# Patient Record
Sex: FEMALE | Race: WHITE | NOT HISPANIC OR LATINO | Employment: FULL TIME | URBAN - METROPOLITAN AREA
[De-identification: names, ages, dates, MRNs, and addresses within clinical notes are randomized per-mention and may not be internally consistent; named-entity substitution may affect disease eponyms.]

---

## 2022-01-06 ENCOUNTER — TELEMEDICINE (OUTPATIENT)
Dept: INTERNAL MEDICINE | Facility: CLINIC | Age: 22
End: 2022-01-06

## 2022-01-06 DIAGNOSIS — F32.A ANXIETY AND DEPRESSION: Primary | ICD-10-CM

## 2022-01-06 DIAGNOSIS — F41.9 ANXIETY AND DEPRESSION: Primary | ICD-10-CM

## 2022-01-06 PROCEDURE — 99204 OFFICE O/P NEW MOD 45 MIN: CPT | Performed by: FAMILY MEDICINE

## 2022-01-06 RX ORDER — ESCITALOPRAM OXALATE 10 MG/1
10 TABLET ORAL DAILY
Qty: 60 TABLET | Refills: 1 | Status: SHIPPED | OUTPATIENT
Start: 2022-01-06 | End: 2022-03-17 | Stop reason: DRUGHIGH

## 2022-01-06 NOTE — PATIENT INSTRUCTIONS
"[email or web address]">   Supporting Someone With Anxiety  Anxiety is a mental health condition that causes nervousness or worry, which interferes with daily activities and relationships. Anxiety disorders include:  · Generalized anxiety disorder (GRANT).  · Social anxiety.  · Post-traumatic stress disorder (PTSD).  Anxiety affects the person who has it as well as friends and family members. Friends and family can help by offering support and understanding.  What do I need to know about this condition?  Anxiety is the experience of excessive nervousness or worry that feels out of control. Anxiety causes distress and prevents someone from living a normal life. Anxiety may:  · Last for long periods and be unpredictable.  · Cause restlessness, fatigue, extreme emotions, or irritability.  · Make it hard to fall asleep or focus your attention.  What do I need to know about the treatment options?  Anxiety disorders are treated by specialists with one of the following:  · Talk therapy or counseling. This includes cognitive behavioral therapy (CBT), exposure therapy, eye movement desensitization and reprocessing (EMDR), biofeedback, and acceptance and commitment therapy.  · Medicine to treat anxiety and to help control certain emotions and behaviors.  · Mind-body programs. These programs encourage the person with anxiety to be involved in his or her treatment and feel empowered. Mind-body programs may include mindfulness-based stress reduction training, yoga, or carmencita chi.  How to care for someone with anxiety    Talk about the condition  Good communication is the key to supporting your friend or family member. Keep these things in mind:  · Ask questions and listen to the person's answers. Validate his or her experience.  · Give the person space if she or he does not feel like talking.  · Do not minimize the person's feelings or suggest that he or she should just get over the feelings.  · Give the person time and space to " "become calm. Stay by his or her side.  · Never ignore what the person says about suicide.  ? Talking about suicide will not make your loved one want to commit suicide.  ? You or your loved one can reach out 24 hours a day to get free, private support (on the phone or a live online chat) from a suicide crisis helpline, such as the National Suicide Prevention Lifeline at 1-549.114.2613.  · If appropriate, go with the person to visits with a counselor or health care provider. If the person agrees, ask the health care provider for any advice he or she may have for you.  Create a safe environment  · For certain types of anxiety, such as PTSD, do these things:  ? Discuss alcohol and drugs. Come up with rules that you both agree to.  ? Discuss guns and other weapons. Agree on where they should be stored. Keep ammunition in a separate locked location.  · Make a written crisis plan. Include important phone numbers, such as the local crisis intervention team. Make sure that:  ? The person with anxiety knows about this plan and agrees with it.  ? Everyone who has regular contact with the person knows about the plan and knows what to do in an emergency.  ? The written plan is easily accessible and can be quickly put into action.  Support the person in other ways  · Make an effort to learn all you can about your loved one's form of anxiety.  · Include your loved one in activities. Invite him or her to go for walks and outings.  · Help your loved one follow his or her treatment plan as directed by health care providers. This could mean driving him or her to therapy sessions or suggesting ways to manage stress.  · Remember that your support really matters. Social support is a huge benefit for someone who is living with anxiety.  How to care for yourself  Supporting someone with anxiety can be demanding. Make sure to take care of yourself.  · Maintain your normal routine.  · Respect your own limits. Say \"no\" to requests that do not " work for you.  · Make time for activities that help you relax, such as spending time with friends.  · Practice deep breathing exercises to lower your stress. Attend some mind-body classes by yourself or with your loved one.  · Get plenty of sleep.  · Exercise several times a week.  · If you are struggling emotionally with guilt, fear, or anger, consider working with a therapist.  What are some signs that the person's condition is getting worse?  Signs that your loved one's condition may be getting worse include:  · Dramatic mood swings, such as irritability, tearfulness, or lacking any emotion.  · Avoiding activities that he or she used to enjoy, or isolating herself or himself.  · Drinking more alcohol than normal.  Where to find support  For both you and your loved one:  · Consider joining self-help and support groups. They can help you feel a sense of hope and connect you with local resources to help you learn more.  · Consider family therapy to help you stay connected.  Where to find more information  A health care provider may make recommendations. They include:  · Substance Abuse and Mental Health Services Administration (SAMHSA): www.samhsa.gov  · National [place] Mental Illness (YUMIKO): www.yumiko.org  Get help right away if:  · Your loved one's behavior becomes hard to predict (erratic).  · Your loved one shows behaviors such as seeing, feeling, tasting, or hearing things that are not real (hallucinations) or having flashbacks.  · Your loved one expresses thoughts about harming himself or herself or others.  If you ever feel like your loved one may hurt himself or herself or others, or shares thoughts about taking his or her own life, get help right away. You can go to your nearest emergency department or:  · Call your local emergency services (911 in the [place]  · Call a suicide crisis helpline, such as the National Suicide Prevention Lifeline at 1-230.597.4936. This is open 24 hours a day in the  [place]  · Text the Crisis Text Line at 600201 (in the [place]  Summary  · People with anxiety disorders experience overwhelming feelings of nervousness or worry. Friends and family can help by offering support and understanding.  · Anxiety disorders are treated by mental health specialists. Various forms of talk therapy such as CBT, EMDR, and exposure therapy combined with mind-body programs can be helpful.  · Be compassionate and listen to your loved one. Be available if your loved one wants to talk, but give your loved one space if he or she does not feel like talking.  · Find ways to care for your own body, mind, and well-being while supporting someone with anxiety. Try to maintain your normal routines and make time to do things that you enjoy.  This information is not intended to replace advice given to you by your health care provider. Make sure you discuss any questions you have with your health care provider.  Document Revised: 02/25/2021 Document Reviewed: 10/29/2020  Elsevier Patient Education © 2021 [employer].

## 2022-01-06 NOTE — PROGRESS NOTES
Mode of Visit: Video  Location of patient: home  You have chosen to receive care through a telehealth visit.  Does the patient consent to use a video/audio connection for your medical care today? Yes  The visit included audio and video interaction. No technical issues occurred during this visit.         Subjective   Ericka Begr is a 21 y.o. female.     History of Present Illness     Video Visit was done today because of COVID-19.  patient has consented to receive care via Video Visit   Patient location ;home    CC; To establish care, discuss the followings ;  Anxiety with depression ; has anxiety for > 2 months, sx gets worse lately , sx worse when she is around people and with COVID   Get nervious so easily , in bed all day  Feel sad all the time , her HR racing , HA , feels restless  +ve FHx for mental illness   Never dx with Anxiety or depression   She is not on any medication   Had 5 peoples passed in 2 weeks   Even before that , she is in lot of stress   Poor sleeping , sleeps on day time  She will start to work at [employer] night shift    No current outpatient medications on file prior to visit.     No current facility-administered medications on file prior to visit.       The following portions of the patient's history were reviewed and updated as appropriate: allergies, current medications, past family history, past medical history, past social history, past surgical history and problem list.    Review of Systems   Psychiatric/Behavioral: Positive for decreased concentration and depressed mood. Negative for agitation and behavioral problems. The patient is nervous/anxious.        Objective   There were no vitals taken for this visit.  Physical Exam  Constitutional:       General: She is not in acute distress.     Appearance: She is not ill-appearing, toxic-appearing or diaphoretic.   Neurological:      Mental Status: She is alert and oriented to person, place, and time.   Psychiatric:         Mood and  Affect: Mood normal.         Behavior: Behavior normal.         Thought Content: Thought content normal.         Judgment: Judgment normal.           Assessment/Plan      Diagnoses and all orders for this visit:    1. Anxiety and depression (Primary);  - New problem , will start on;  -     escitalopram (Lexapro) 10 MG tablet; Take 1 tablet by mouth Daily.  Dispense: 60 tablet; Refill: 1  Advised patient SSRI medication may take 4-6 weeks to notice an effect.  Discussed potential side effects including headache, dizziness, GI symptoms, sexual side effects, worsening mood or suicidal ideations.  Patient advised to call office for development of any side effects or questions. To ER for SI/HI.          I have reviewed the limitations of a telehealth visit (such as lack of a physical exam and unable to obtain vital signs) and advised the patient that they may need to follow up for an office visit should their symptoms or concerns persist, worsen, or change.  Patient was encouraged to keep me informed of any acute changes, lack of improvement, or any new concerning symptoms.   I discussed my findings,recommendations, and plan of care was with the patient. They verbalized understanding and agreement.

## 2022-02-14 ENCOUNTER — LAB (OUTPATIENT)
Dept: LAB | Facility: HOSPITAL | Age: 22
End: 2022-02-14

## 2022-02-14 ENCOUNTER — OFFICE VISIT (OUTPATIENT)
Dept: INTERNAL MEDICINE | Facility: CLINIC | Age: 22
End: 2022-02-14

## 2022-02-14 VITALS
SYSTOLIC BLOOD PRESSURE: 104 MMHG | WEIGHT: 247.2 LBS | BODY MASS INDEX: 51.89 KG/M2 | HEIGHT: 58 IN | TEMPERATURE: 96.6 F | OXYGEN SATURATION: 100 % | HEART RATE: 72 BPM | DIASTOLIC BLOOD PRESSURE: 68 MMHG

## 2022-02-14 DIAGNOSIS — F41.9 ANXIETY AND DEPRESSION: ICD-10-CM

## 2022-02-14 DIAGNOSIS — R68.89 COLD INTOLERANCE: ICD-10-CM

## 2022-02-14 DIAGNOSIS — F32.A ANXIETY AND DEPRESSION: ICD-10-CM

## 2022-02-14 DIAGNOSIS — F32.A ANXIETY AND DEPRESSION: Primary | ICD-10-CM

## 2022-02-14 DIAGNOSIS — F41.9 ANXIETY AND DEPRESSION: Primary | ICD-10-CM

## 2022-02-14 DIAGNOSIS — E03.9 ACQUIRED HYPOTHYROIDISM: ICD-10-CM

## 2022-02-14 LAB
DEPRECATED RDW RBC AUTO: 38.1 FL (ref 37–54)
ERYTHROCYTE [DISTWIDTH] IN BLOOD BY AUTOMATED COUNT: 12.1 % (ref 12.3–15.4)
HCT VFR BLD AUTO: 43.9 % (ref 34–46.6)
HGB BLD-MCNC: 14.5 G/DL (ref 12–15.9)
MCH RBC QN AUTO: 28.7 PG (ref 26.6–33)
MCHC RBC AUTO-ENTMCNC: 33 G/DL (ref 31.5–35.7)
MCV RBC AUTO: 86.8 FL (ref 79–97)
PLATELET # BLD AUTO: 383 10*3/MM3 (ref 140–450)
PMV BLD AUTO: 10 FL (ref 6–12)
RBC # BLD AUTO: 5.06 10*6/MM3 (ref 3.77–5.28)
WBC NRBC COR # BLD: 12.14 10*3/MM3 (ref 3.4–10.8)

## 2022-02-14 PROCEDURE — 84443 ASSAY THYROID STIM HORMONE: CPT | Performed by: FAMILY MEDICINE

## 2022-02-14 PROCEDURE — 84439 ASSAY OF FREE THYROXINE: CPT | Performed by: FAMILY MEDICINE

## 2022-02-14 PROCEDURE — 99214 OFFICE O/P EST MOD 30 MIN: CPT | Performed by: FAMILY MEDICINE

## 2022-02-14 PROCEDURE — 85027 COMPLETE CBC AUTOMATED: CPT | Performed by: FAMILY MEDICINE

## 2022-02-14 PROCEDURE — 80048 BASIC METABOLIC PNL TOTAL CA: CPT | Performed by: FAMILY MEDICINE

## 2022-02-14 NOTE — PROGRESS NOTES
Answers for HPI/ROS submitted by the patient on 2/7/2022  Please describe your symptoms.: this is a follow up regarding my recent diagnosis.  Have you had these symptoms before?: Yes  How long have you been having these symptoms?: Greater than 2 weeks  Please list any medications you are currently taking for this condition.: escitalopram 10mg  What is the primary reason for your visit?: Other    Subjective     Ericka Berg is a 21 y.o. female.     Chief Complaint   Patient presents with   • Anxiety   • Depression       History of Present Illness     Pt with chronic Anxiety with depression for > 3 months.  Last OV started on Lexapro 10 mg, she is doing well except for makes her sleepy ,She works night shifts so she takes the medication in am before go to sleep. Other than that she feels little better, she is less anxious than before.   She has cold intolerance , no h/o thyroid dis  Denies SI/SA    The following portions of the patient's history were reviewed and updated as appropriate: allergies, current medications, past family history, past medical history, past social history, past surgical history and problem list.        Review of Systems   Endocrine: Positive for cold intolerance.   Neurological: Negative for facial asymmetry, speech difficulty, light-headedness, numbness and headache.       Vitals:    02/14/22 1259   BP: 104/68   Pulse: 72   Temp: 96.6 °F (35.9 °C)   SpO2: 100%           02/14/22  1259   Weight: 112 kg (247 lb 3.2 oz)         Body mass index is 51.66 kg/m².      Current Outpatient Medications   Medication Sig Dispense Refill   • escitalopram (Lexapro) 10 MG tablet Take 1 tablet by mouth Daily. 60 tablet 1     No current facility-administered medications for this visit.                Objective   Physical Exam  Vitals and nursing note reviewed.   Constitutional:       General: She is not in acute distress.     Appearance: She is obese. She is not ill-appearing, toxic-appearing or diaphoretic.    HENT:      Mouth/Throat:      Mouth: Mucous membranes are moist.   Neck:      Comments: No enlarged thyroid    Cardiovascular:      Rate and Rhythm: Normal rate and regular rhythm.      Heart sounds: Normal heart sounds. No murmur heard.      Pulmonary:      Effort: Pulmonary effort is normal. No respiratory distress.      Breath sounds: Normal breath sounds. No stridor. No wheezing or rhonchi.   Musculoskeletal:      Cervical back: Neck supple.   Skin:     General: Skin is warm.      Findings: No rash.   Neurological:      Mental Status: She is alert and oriented to person, place, and time.   Psychiatric:         Mood and Affect: Mood normal.         Behavior: Behavior normal.         Thought Content: Thought content normal.           Assessment/Plan   Diagnoses and all orders for this visit:    1. Anxiety and depression (Primary);  - Feels better, will continue on same dose for now.  -     CBC (No Diff); Future  -     Basic Metabolic Panel; Future    2. Cold intolerance  -     TSH Rfx On Abnormal To Free T4; Future    Addendum 2/15;  Labs showed elevated TSH with low T4  Lab Results   Component Value Date    TSH 9.730 (H) 02/14/2022     Will start on Levothyroxine 50 mcg in am , will recheck TSH in 6 weeks           I was wearing N95 mask and eye protection during the entire duration of the visit.   Patient was masked the whole entire time.   Minimum social distance of 6 ft maintained through entire visit except for physical exam as documented.          I have fully discussed the nature of the medical condition(s) risks, complications, management, safe and proper use of medications.   I have discussed the SIDE EFFECT OF MEDICATION and importance TO report any side effect , the patient expressed good understanding.  Encouraged medication compliance and the importance of keeping scheduled follow up appointments with me and any other providers.    Patient instructed to follow up with our office for results on any  labs/imaging ordered during this visit.    Home care discussed  All questions answered  Patient verbalizes understanding and agrees to treatment plan.     Follow up: Return in about 31 days (around 3/17/2022) for physical, PAP.

## 2022-02-14 NOTE — PATIENT INSTRUCTIONS
"[email or web address]">   Supporting Someone With Anxiety  Anxiety is a mental health condition that causes nervousness or worry, which interferes with daily activities and relationships. Anxiety disorders include:  · Generalized anxiety disorder (GRANT).  · Social anxiety.  · Post-traumatic stress disorder (PTSD).  Anxiety affects the person who has it as well as friends and family members. Friends and family can help by offering support and understanding.  What do I need to know about this condition?  Anxiety is the experience of excessive nervousness or worry that feels out of control. Anxiety causes distress and prevents someone from living a normal life. Anxiety may:  · Last for long periods and be unpredictable.  · Cause restlessness, fatigue, extreme emotions, or irritability.  · Make it hard to fall asleep or focus your attention.  What do I need to know about the treatment options?  Anxiety disorders are treated by specialists with one of the following:  · Talk therapy or counseling. This includes cognitive behavioral therapy (CBT), exposure therapy, eye movement desensitization and reprocessing (EMDR), biofeedback, and acceptance and commitment therapy.  · Medicine to treat anxiety and to help control certain emotions and behaviors.  · Mind-body programs. These programs encourage the person with anxiety to be involved in his or her treatment and feel empowered. Mind-body programs may include mindfulness-based stress reduction training, yoga, or carmencita chi.  How to care for someone with anxiety    Talk about the condition  Good communication is the key to supporting your friend or family member. Keep these things in mind:  · Ask questions and listen to the person's answers. Validate his or her experience.  · Give the person space if she or he does not feel like talking.  · Do not minimize the person's feelings or suggest that he or she should just get over the feelings.  · Give the person time and space to " "become calm. Stay by his or her side.  · Never ignore what the person says about suicide.  ? Talking about suicide will not make your loved one want to commit suicide.  ? You or your loved one can reach out 24 hours a day to get free, private support (on the phone or a live online chat) from a suicide crisis helpline, such as the National Suicide Prevention Lifeline at 1-823.576.4958.  · If appropriate, go with the person to visits with a counselor or health care provider. If the person agrees, ask the health care provider for any advice he or she may have for you.  Create a safe environment  · For certain types of anxiety, such as PTSD, do these things:  ? Discuss alcohol and drugs. Come up with rules that you both agree to.  ? Discuss guns and other weapons. Agree on where they should be stored. Keep ammunition in a separate locked location.  · Make a written crisis plan. Include important phone numbers, such as the local crisis intervention team. Make sure that:  ? The person with anxiety knows about this plan and agrees with it.  ? Everyone who has regular contact with the person knows about the plan and knows what to do in an emergency.  ? The written plan is easily accessible and can be quickly put into action.  Support the person in other ways  · Make an effort to learn all you can about your loved one's form of anxiety.  · Include your loved one in activities. Invite him or her to go for walks and outings.  · Help your loved one follow his or her treatment plan as directed by health care providers. This could mean driving him or her to therapy sessions or suggesting ways to manage stress.  · Remember that your support really matters. Social support is a huge benefit for someone who is living with anxiety.  How to care for yourself  Supporting someone with anxiety can be demanding. Make sure to take care of yourself.  · Maintain your normal routine.  · Respect your own limits. Say \"no\" to requests that do not " work for you.  · Make time for activities that help you relax, such as spending time with friends.  · Practice deep breathing exercises to lower your stress. Attend some mind-body classes by yourself or with your loved one.  · Get plenty of sleep.  · Exercise several times a week.  · If you are struggling emotionally with guilt, fear, or anger, consider working with a therapist.  What are some signs that the person's condition is getting worse?  Signs that your loved one's condition may be getting worse include:  · Dramatic mood swings, such as irritability, tearfulness, or lacking any emotion.  · Avoiding activities that he or she used to enjoy, or isolating herself or himself.  · Drinking more alcohol than normal.  Where to find support  For both you and your loved one:  · Consider joining self-help and support groups. They can help you feel a sense of hope and connect you with local resources to help you learn more.  · Consider family therapy to help you stay connected.  Where to find more information  A health care provider may make recommendations. They include:  · Substance Abuse and Mental Health Services Administration (SAMHSA): www.samhsa.gov  · National [place] on Mental Illness (YUMIKO): www.yumiko.org  Get help right away if:  · Your loved one's behavior becomes hard to predict (erratic).  · Your loved one shows behaviors such as seeing, feeling, tasting, or hearing things that are not real (hallucinations) or having flashbacks.  · Your loved one expresses thoughts about harming himself or herself or others.  If you ever feel like your loved one may hurt himself or herself or others, or shares thoughts about taking his or her own life, get help right away. You can go to your nearest emergency department or:  · Call your local emergency services (911 in the [place]  · Call a suicide crisis helpline, such as the National Suicide Prevention Lifeline at 1-122.324.1111. This is open 24 hours a day in the  [place]  · Text the Crisis Text Line at 580696 (in the [place]  Summary  · People with anxiety disorders experience overwhelming feelings of nervousness or worry. Friends and family can help by offering support and understanding.  · Anxiety disorders are treated by mental health specialists. Various forms of talk therapy such as CBT, EMDR, and exposure therapy combined with mind-body programs can be helpful.  · Be compassionate and listen to your loved one. Be available if your loved one wants to talk, but give your loved one space if he or she does not feel like talking.  · Find ways to care for your own body, mind, and well-being while supporting someone with anxiety. Try to maintain your normal routines and make time to do things that you enjoy.  This information is not intended to replace advice given to you by your health care provider. Make sure you discuss any questions you have with your health care provider.  Document Revised: 02/25/2021 Document Reviewed: 10/29/2020  Elsevier Patient Education © 2021 [employer] Inc.

## 2022-02-15 ENCOUNTER — TELEPHONE (OUTPATIENT)
Dept: INTERNAL MEDICINE | Facility: CLINIC | Age: 22
End: 2022-02-15

## 2022-02-15 LAB
ANION GAP SERPL CALCULATED.3IONS-SCNC: 11 MMOL/L (ref 5–15)
BUN SERPL-MCNC: 12 MG/DL (ref 6–20)
BUN/CREAT SERPL: 13.8 (ref 7–25)
CALCIUM SPEC-SCNC: 8.7 MG/DL (ref 8.6–10.5)
CHLORIDE SERPL-SCNC: 101 MMOL/L (ref 98–107)
CO2 SERPL-SCNC: 26 MMOL/L (ref 22–29)
CREAT SERPL-MCNC: 0.87 MG/DL (ref 0.57–1)
GFR SERPL CREATININE-BSD FRML MDRD: 81 ML/MIN/1.73
GLUCOSE SERPL-MCNC: 76 MG/DL (ref 65–99)
POTASSIUM SERPL-SCNC: 3.7 MMOL/L (ref 3.5–5.2)
SODIUM SERPL-SCNC: 138 MMOL/L (ref 136–145)
T4 FREE SERPL-MCNC: 0.92 NG/DL (ref 0.93–1.7)
TSH SERPL DL<=0.05 MIU/L-ACNC: 9.73 UIU/ML (ref 0.27–4.2)

## 2022-02-15 RX ORDER — LEVOTHYROXINE SODIUM 0.05 MG/1
50 TABLET ORAL
Qty: 60 TABLET | Refills: 0 | Status: SHIPPED | OUTPATIENT
Start: 2022-02-15 | End: 2022-04-12 | Stop reason: DRUGHIGH

## 2022-02-15 NOTE — ADDENDUM NOTE
Addended by: ANTONIETA FREDERICK on: 2/15/2022 08:43 AM     Modules accepted: Orders, Level of Service

## 2022-02-15 NOTE — TELEPHONE ENCOUNTER
----- Message from Jackson Hudson MD sent at 2/15/2022  8:46 AM EST -----  PLEASE call for lab results, showed abnormal thyroid results, she needs to start taking thyroid medication which sent to pharmacy. Needs to take it in am on empty stomach , can eat after 30 minutes , should take it regularly without missing any dose, if she missed one she can take 2 on next day. We also need to adjust the dose as well, so I need to see her in 6 weeks but she needs to get blood test few days before her appoint so we can discuss the result and adjust medication.    TSH ordered.

## 2022-03-17 ENCOUNTER — OFFICE VISIT (OUTPATIENT)
Dept: INTERNAL MEDICINE | Facility: CLINIC | Age: 22
End: 2022-03-17

## 2022-03-17 VITALS
OXYGEN SATURATION: 98 % | SYSTOLIC BLOOD PRESSURE: 110 MMHG | HEART RATE: 84 BPM | TEMPERATURE: 97.1 F | HEIGHT: 58 IN | WEIGHT: 242.8 LBS | DIASTOLIC BLOOD PRESSURE: 72 MMHG | BODY MASS INDEX: 50.96 KG/M2

## 2022-03-17 DIAGNOSIS — Z01.419 WOMEN'S ANNUAL ROUTINE GYNECOLOGICAL EXAMINATION: ICD-10-CM

## 2022-03-17 DIAGNOSIS — F41.9 ANXIETY: ICD-10-CM

## 2022-03-17 DIAGNOSIS — Z00.00 ENCOUNTER FOR ANNUAL PHYSICAL EXAM: Primary | ICD-10-CM

## 2022-03-17 DIAGNOSIS — E03.9 ACQUIRED HYPOTHYROIDISM: ICD-10-CM

## 2022-03-17 DIAGNOSIS — E66.01 CLASS 3 SEVERE OBESITY DUE TO EXCESS CALORIES WITHOUT SERIOUS COMORBIDITY WITH BODY MASS INDEX (BMI) OF 50.0 TO 59.9 IN ADULT: ICD-10-CM

## 2022-03-17 LAB
BILIRUB BLD-MCNC: NEGATIVE MG/DL
CLARITY, POC: CLEAR
COLOR UR: YELLOW
EXPIRATION DATE: ABNORMAL
GLUCOSE UR STRIP-MCNC: NEGATIVE MG/DL
KETONES UR QL: NEGATIVE
LEUKOCYTE EST, POC: ABNORMAL
Lab: ABNORMAL
NITRITE UR-MCNC: NEGATIVE MG/ML
PH UR: 6 [PH] (ref 5–8)
PROT UR STRIP-MCNC: ABNORMAL MG/DL
RBC # UR STRIP: ABNORMAL /UL
SP GR UR: 1.02 (ref 1–1.03)
UROBILINOGEN UR QL: NORMAL

## 2022-03-17 PROCEDURE — 99395 PREV VISIT EST AGE 18-39: CPT | Performed by: FAMILY MEDICINE

## 2022-03-17 PROCEDURE — 81003 URINALYSIS AUTO W/O SCOPE: CPT | Performed by: FAMILY MEDICINE

## 2022-03-17 RX ORDER — ESCITALOPRAM OXALATE 20 MG/1
20 TABLET ORAL DAILY
Qty: 30 TABLET | Refills: 3 | Status: SHIPPED | OUTPATIENT
Start: 2022-03-17 | End: 2022-04-12 | Stop reason: SDUPTHER

## 2022-03-17 NOTE — PROGRESS NOTES
Patient Care Team:  Jackson Hudson MD as PCP - General (Family Medicine)     Chief complaint: Patient is in today for a physical          Patient is a 21 y.o. female who presents for her yearly physical exam.     HPI      Doing well, try to eat HD and do daily exercise, she lost few Ibs since last OV   Hyothyroid ; doing well on Levoth 50, no new concern   Anxiety ; sx stable with current Rx, no S/E reported   Obesity ; as above     Health maintenance/lifestyle:    There is no immunization history on file for this patient.          PHQ-2 Depression Screening  Little interest or pleasure in doing things?  0   Feeling down, depressed, or hopeless?  0   PHQ-2 Total Score  0     Social History     Tobacco Use   Smoking Status Never Smoker   Smokeless Tobacco Never Used     Social History     Substance and Sexual Activity   Alcohol Use Never         Review of Systems   Cardiovascular: Negative for chest pain, palpitations and leg swelling.   Psychiatric/Behavioral: Negative for agitation, behavioral problems, decreased concentration and depressed mood.   All other systems reviewed and are negative.        History  Past Medical History:   Diagnosis Date   • Anxiety    • Depression       Past Surgical History:   Procedure Laterality Date   • GALLBLADDER SURGERY  2019      No Known Allergies   Family History   Problem Relation Age of Onset   • Diabetes Paternal Grandmother      Social History     Socioeconomic History   • Marital status:    Tobacco Use   • Smoking status: Never Smoker   • Smokeless tobacco: Never Used   Vaping Use   • Vaping Use: Never used   Substance and Sexual Activity   • Alcohol use: Never   • Drug use: Yes     Types: Marijuana   • Sexual activity: Yes        Current Outpatient Medications:   •  levothyroxine (Synthroid) 50 MCG tablet, Take 1 tablet by mouth Every Morning., Disp: 60 tablet, Rfl: 0  •  escitalopram (Lexapro) 20 MG tablet, Take 1 tablet by mouth Daily., Disp: 30 tablet, Rfl: 3  "                 /72   Pulse 84   Temp 97.1 °F (36.2 °C) (Temporal)   Ht 148 cm (58.25\")   Wt 110 kg (242 lb 12.8 oz)   SpO2 98%   BMI 50.31 kg/m²       Physical Exam  Vitals and nursing note reviewed.   Constitutional:       Appearance: She is well-developed. She is obese. She is not ill-appearing or diaphoretic.   HENT:      Head: Normocephalic.      Right Ear: Tympanic membrane, ear canal and external ear normal.      Left Ear: Tympanic membrane, ear canal and external ear normal.      Nose: No congestion or rhinorrhea.      Mouth/Throat:      Mouth: Mucous membranes are moist.      Pharynx: Oropharynx is clear. No oropharyngeal exudate or posterior oropharyngeal erythema.   Eyes:      General: No scleral icterus.        Right eye: No discharge.         Left eye: No discharge.      Extraocular Movements: Extraocular movements intact.      Conjunctiva/sclera: Conjunctivae normal.      Pupils: Pupils are equal, round, and reactive to light.   Neck:      Thyroid: No thyromegaly.      Comments: No enlarged thyroid    Cardiovascular:      Rate and Rhythm: Normal rate and regular rhythm.      Heart sounds: Normal heart sounds. No murmur heard.    No friction rub. No gallop.   Pulmonary:      Effort: Pulmonary effort is normal. No respiratory distress.      Breath sounds: Normal breath sounds. No stridor. No wheezing, rhonchi or rales.   Abdominal:      General: Bowel sounds are normal. There is no distension.      Palpations: Abdomen is soft. There is no mass.      Tenderness: There is no abdominal tenderness. There is no guarding or rebound.      Hernia: No hernia is present.   Genitourinary:     General: Normal vulva.      Exam position: Lithotomy position.      Pubic Area: No rash.       Labia:         Right: No rash, tenderness or lesion.         Left: No rash, tenderness or lesion.       Urethra: No urethral lesion.      Vagina: Normal. No erythema or bleeding.      Cervix: No discharge or lesion.      " Uterus: Not enlarged.       Adnexa:         Right: No mass or tenderness.          Left: No mass or tenderness.     Musculoskeletal:         General: Normal range of motion.      Cervical back: Normal range of motion and neck supple.   Lymphadenopathy:      Cervical: No cervical adenopathy.      Lower Body: No right inguinal adenopathy. No left inguinal adenopathy.   Skin:     General: Skin is warm.      Coloration: Skin is not jaundiced or pale.      Findings: No erythema or rash.   Neurological:      General: No focal deficit present.      Mental Status: She is alert and oriented to person, place, and time.      Cranial Nerves: No cranial nerve deficit.      Sensory: No sensory deficit.      Motor: No weakness or abnormal muscle tone.      Coordination: Coordination normal.      Gait: Gait normal.      Deep Tendon Reflexes: Reflexes normal.   Psychiatric:         Mood and Affect: Mood normal.         Behavior: Behavior normal.         Thought Content: Thought content normal.         Judgment: Judgment normal.                   Diagnoses and all orders for this visit:    1. Encounter for annual physical exam (Primary)  -     POC Urinalysis Dipstick, Automated    2. Women's annual routine gynecological examination  -     Liquid-based Pap Smear, Screening; Future    3. Anxiety ;  - Well controlled , will continue current Rx  -     escitalopram (Lexapro) 20 MG tablet; Take 1 tablet by mouth Daily.  Dispense: 30 tablet; Refill: 3    4. Acquired hypothyroidism  -     TSH; Future    5. Morbid obesity;  - Discussed  in length about lifestyle modification and wt loss   Patient's (Body mass index is 50.31 kg/m².) indicates that they are morbidly obese (BMI > 40 or > 35 with obesity - related health condition) with health related conditions that include none . Weight is unchanged. BMI is is above average; no BMI management plan is appropriate. We discussed portion control and increasing exercise.         Lifestyle  Counseling:  · Lifestyle Modifications: Continue good lifestyle choices/modifications, Follow a low fat, low cholesterol diet, Reduce exposure to stress if possible and Discussed sexual issues, safe sex practices  · Safety Issues: Always wear seatbelt, Avoid texting while driving   · Use sunscreen, regular skin examination  · Recommended annual dental/vision examination.  · Emotional/Stress/Sleep: Reviewed and  given when appropriate    Follow up: Return in about 3 weeks (around 4/7/2022) for follow up on current illness, labs before apointment.  Plan of care discussed with pt. They verbalized understanding and agreement.     Jackson Hudson MD   4/3/2022   16:52 EDT

## 2022-03-23 ENCOUNTER — TELEPHONE (OUTPATIENT)
Dept: INTERNAL MEDICINE | Facility: CLINIC | Age: 22
End: 2022-03-23

## 2022-03-23 NOTE — TELEPHONE ENCOUNTER
----- Message from Jackson Hudson MD sent at 3/23/2022  3:46 PM EDT -----  PLEASE call for PAP results,It was neg for HPV ( which is good ) but the sample was not enough to check for abnormal cells , so need to repeat it in 3-4 months

## 2022-03-24 ENCOUNTER — TELEPHONE (OUTPATIENT)
Dept: INTERNAL MEDICINE | Facility: CLINIC | Age: 22
End: 2022-03-24

## 2022-04-07 ENCOUNTER — OFFICE VISIT (OUTPATIENT)
Dept: INTERNAL MEDICINE | Facility: CLINIC | Age: 22
End: 2022-04-07

## 2022-04-07 ENCOUNTER — LAB (OUTPATIENT)
Dept: LAB | Facility: HOSPITAL | Age: 22
End: 2022-04-07

## 2022-04-07 VITALS
HEART RATE: 66 BPM | TEMPERATURE: 96.8 F | SYSTOLIC BLOOD PRESSURE: 98 MMHG | BODY MASS INDEX: 51.85 KG/M2 | HEIGHT: 58 IN | OXYGEN SATURATION: 99 % | DIASTOLIC BLOOD PRESSURE: 54 MMHG | WEIGHT: 247 LBS

## 2022-04-07 DIAGNOSIS — F32.A ANXIETY AND DEPRESSION: ICD-10-CM

## 2022-04-07 DIAGNOSIS — E03.9 ACQUIRED HYPOTHYROIDISM: Primary | ICD-10-CM

## 2022-04-07 DIAGNOSIS — Z13.220 NEED FOR LIPID SCREENING: ICD-10-CM

## 2022-04-07 DIAGNOSIS — E66.01 CLASS 3 SEVERE OBESITY DUE TO EXCESS CALORIES WITHOUT SERIOUS COMORBIDITY WITH BODY MASS INDEX (BMI) OF 50.0 TO 59.9 IN ADULT: ICD-10-CM

## 2022-04-07 DIAGNOSIS — E03.9 ACQUIRED HYPOTHYROIDISM: ICD-10-CM

## 2022-04-07 DIAGNOSIS — E86.0 DEHYDRATION, MILD: ICD-10-CM

## 2022-04-07 DIAGNOSIS — F41.9 ANXIETY AND DEPRESSION: ICD-10-CM

## 2022-04-07 LAB
CHOLEST SERPL-MCNC: 161 MG/DL (ref 0–200)
HDLC SERPL-MCNC: 39 MG/DL (ref 40–60)
LDLC SERPL CALC-MCNC: 108 MG/DL (ref 0–100)
LDLC/HDLC SERPL: 2.76 {RATIO}
TRIGL SERPL-MCNC: 71 MG/DL (ref 0–150)
VLDLC SERPL-MCNC: 14 MG/DL (ref 5–40)

## 2022-04-07 PROCEDURE — 80061 LIPID PANEL: CPT | Performed by: FAMILY MEDICINE

## 2022-04-07 PROCEDURE — 99214 OFFICE O/P EST MOD 30 MIN: CPT | Performed by: FAMILY MEDICINE

## 2022-04-07 PROCEDURE — 84443 ASSAY THYROID STIM HORMONE: CPT | Performed by: FAMILY MEDICINE

## 2022-04-07 NOTE — PROGRESS NOTES
Subjective     Ericka Berg is a 21 y.o. female.     Chief Complaint   Patient presents with   • Anxiety   • Hypothyroidism       History of Present Illness     Chronic Anxiety with depression ; DOING WELL on Lexapro 20 mg.  Denies SI/SA    Obesity; eats RD, not doing Ex    Noticed her BP low today, she is not eating or drinking till now as she just woke up.    Hyothyroid ; doing well on Levoth 50, she is taking it in am before BF. Due for labs  Lab Results   Component Value Date    TSH 9.730 (H) 02/14/2022            The following portions of the patient's history were reviewed and updated as appropriate: allergies, current medications, past family history, past medical history, past social history, past surgical history and problem list.        Review of Systems   Cardiovascular: Negative for chest pain, palpitations and leg swelling.       Vitals:    04/07/22 1557   BP: 98/54   Pulse: 66   Temp: 96.8 °F (36 °C)   SpO2: 99%           04/07/22  1557   Weight: 112 kg (247 lb)         Body mass index is 51.15 kg/m².      Current Outpatient Medications   Medication Sig Dispense Refill   • escitalopram (Lexapro) 20 MG tablet Take 1 tablet by mouth Daily. 30 tablet 3   • levothyroxine (Synthroid) 50 MCG tablet Take 1 tablet by mouth Every Morning. 60 tablet 0     No current facility-administered medications for this visit.                Objective   Physical Exam  Vitals and nursing note reviewed.   Constitutional:       General: She is not in acute distress.     Appearance: She is not ill-appearing, toxic-appearing or diaphoretic.      Comments: Morbidly obese    HENT:      Mouth/Throat:      Mouth: Mucous membranes are dry.   Eyes:      Conjunctiva/sclera: Conjunctivae normal.   Neck:      Comments: No enlarged thyroid    Cardiovascular:      Rate and Rhythm: Normal rate and regular rhythm.      Heart sounds: Normal heart sounds. No murmur heard.  Pulmonary:      Effort: Pulmonary effort is normal. No respiratory  distress.      Breath sounds: Normal breath sounds. No stridor. No wheezing or rhonchi.   Musculoskeletal:      Cervical back: Neck supple.   Skin:     General: Skin is warm.      Coloration: Skin is not pale.      Findings: No erythema.   Neurological:      Mental Status: She is alert and oriented to person, place, and time.   Psychiatric:         Mood and Affect: Mood normal.         Behavior: Behavior normal.         Thought Content: Thought content normal.         Judgment: Judgment normal.           Assessment/Plan   Diagnoses and all orders for this visit:    1. Acquired hypothyroidism (Primary)  - Stable, continue current Rx for now till get TSH result  - TSH     2. Anxiety and depression  - Stable, continue current Rx    3. Class 3 severe obesity due to excess calories without serious comorbidity with body mass index (BMI) of 50.0 to 59.9 in adult (HCC)  - Discussed in length about lifestyle modification , wt loss, eating healthy , daily exercise   Patient's (Body mass index is 51.15 kg/m².) indicates that they are morbidly obese (BMI > 40 or > 35 with obesity - related health condition) with health related conditions that include none . Weight is worsening. BMI is is above average; no BMI management plan is appropriate. We discussed portion control and increasing exercise.       4. Dehydration, mild;  - Advised to keep self well hydrated     5. Need for lipid screening  -     Lipid Panel; Future       I was wearing N95 mask and eye protection during the entire duration of the visit.   Patient was masked the whole entire time.   Minimum social distance of 6 ft maintained through entire visit except for physical exam as documented.          I have fully discussed the nature of the medical condition(s) risks, complications, management, safe and proper use of medications.   I have discussed the SIDE EFFECT OF MEDICATION and importance TO report any side effect , the patient expressed good  understanding.  Encouraged medication compliance and the importance of keeping scheduled follow up appointments with me and any other providers.    Patient instructed to follow up with our office for results on any labs/imaging ordered during this visit.    Home care discussed  All questions answered  Patient verbalizes understanding and agrees to treatment plan.     Follow up: Return in about 2 months (around 6/7/2022) for follow up on current illness.

## 2022-04-08 LAB — TSH SERPL DL<=0.05 MIU/L-ACNC: 4.49 UIU/ML (ref 0.27–4.2)

## 2022-04-12 DIAGNOSIS — E03.9 ACQUIRED HYPOTHYROIDISM: Primary | ICD-10-CM

## 2022-04-12 DIAGNOSIS — F41.9 ANXIETY: ICD-10-CM

## 2022-04-12 RX ORDER — ESCITALOPRAM OXALATE 20 MG/1
20 TABLET ORAL DAILY
Qty: 90 TABLET | Refills: 1 | Status: SHIPPED | OUTPATIENT
Start: 2022-04-12 | End: 2022-07-28

## 2022-04-12 RX ORDER — LEVOTHYROXINE SODIUM 0.07 MG/1
75 TABLET ORAL DAILY
Qty: 60 TABLET | Refills: 1 | Status: SHIPPED | OUTPATIENT
Start: 2022-04-12 | End: 2022-08-23

## 2022-04-12 NOTE — TELEPHONE ENCOUNTER
----- Message from Jackson Hudson MD sent at 4/12/2022  4:12 PM EDT -----  PLEASE call for lab results;  Her thyroid marker much improve but still not at goa, will increase dose of Levothyroxine to 75 mcg with the same instruction ( take it in am before breakfast) , will recheck level in 6 weeks   Her bad cholesterol LDL slightly elevated , needs to avoid fatty food, do daily exercise , Will recheck level in 6 months

## 2022-04-12 NOTE — TELEPHONE ENCOUNTER
Refill request 90 day suppy escitalopram   Last refill 3/17/22  Last visit 4/7/22  Next visit 5/12/22

## 2022-05-11 ENCOUNTER — TELEPHONE (OUTPATIENT)
Dept: INTERNAL MEDICINE | Facility: CLINIC | Age: 22
End: 2022-05-11

## 2022-05-11 NOTE — TELEPHONE ENCOUNTER
Refill request from [facility] was recd' for escitalopram 10mg   I called spoke with pharmacy to inform that dosage was discontinued on 3/17/22 due to dose adjustment . KRISTYN

## 2022-06-02 ENCOUNTER — OFFICE VISIT (OUTPATIENT)
Dept: INTERNAL MEDICINE | Facility: CLINIC | Age: 22
End: 2022-06-02

## 2022-06-02 VITALS
OXYGEN SATURATION: 95 % | TEMPERATURE: 96.8 F | WEIGHT: 235.2 LBS | HEART RATE: 74 BPM | BODY MASS INDEX: 49.37 KG/M2 | HEIGHT: 58 IN | DIASTOLIC BLOOD PRESSURE: 70 MMHG | SYSTOLIC BLOOD PRESSURE: 110 MMHG

## 2022-06-02 DIAGNOSIS — F32.A ANXIETY AND DEPRESSION: ICD-10-CM

## 2022-06-02 DIAGNOSIS — F41.9 ANXIETY AND DEPRESSION: ICD-10-CM

## 2022-06-02 DIAGNOSIS — R45.86 MOOD SWINGS: Primary | ICD-10-CM

## 2022-06-02 PROCEDURE — 99213 OFFICE O/P EST LOW 20 MIN: CPT | Performed by: FAMILY MEDICINE

## 2022-06-02 NOTE — PROGRESS NOTES
Answers for HPI/ROS submitted by the patient on 5/30/2022  Please describe your symptoms.: major mood swings  Have you had these symptoms before?: Yes  How long have you been having these symptoms?: Greater than 2 weeks  Please list any medications you are currently taking for this condition.: none  Please describe any probable cause for these symptoms. : unsure  What is the primary reason for your visit?: Other    Subjective     Ericka Berg is a 21 y.o. female.     Chief Complaint   Patient presents with   • Manic Behavior     Pt having mood swings and believes bipolar, missed med and felt like she was going crazy        History of Present Illness     She thinks that she has Bipolar as she has on/off mood swings for > 3 months , sx worse lately  She is not taking the medicine for lats few days as she forgot and she was out of town  She lost few Ibs since last OV, eats portion    PHQ-2 Depression Screening  Little interest or pleasure in doing things? 0-->not at all   Feeling down, depressed, or hopeless? 0-->not at all   PHQ-2 Total Score 0           The following portions of the patient's history were reviewed and updated as appropriate: allergies, current medications, past family history, past medical history, past social history, past surgical history and problem list.        Review of Systems   Psychiatric/Behavioral: Positive for dysphoric mood and sleep disturbance. Negative for suicidal ideas and depressed mood. The patient is nervous/anxious.        Vitals:    06/02/22 1603   BP: 110/70   Pulse: 74   Temp: 96.8 °F (36 °C)   SpO2: 95%           06/02/22  1603   Weight: 107 kg (235 lb 3.2 oz)         Body mass index is 48.71 kg/m².      Current Outpatient Medications   Medication Sig Dispense Refill   • escitalopram (Lexapro) 20 MG tablet Take 1 tablet by mouth Daily. 90 tablet 1   • levothyroxine (Synthroid) 75 MCG tablet Take 1 tablet by mouth Daily. 60 tablet 1     No current facility-administered  medications for this visit.                Objective   Physical Exam  Vitals and nursing note reviewed.   Constitutional:       General: She is not in acute distress.     Appearance: She is not ill-appearing, toxic-appearing or diaphoretic.   HENT:      Mouth/Throat:      Mouth: Mucous membranes are moist.   Neck:      Comments: No enlarged thyroid    Cardiovascular:      Rate and Rhythm: Normal rate and regular rhythm.      Heart sounds: Normal heart sounds. No murmur heard.  Pulmonary:      Effort: Pulmonary effort is normal. No respiratory distress.      Breath sounds: Normal breath sounds. No stridor. No wheezing or rhonchi.   Musculoskeletal:      Cervical back: Neck supple.   Skin:     General: Skin is warm.   Neurological:      Mental Status: She is alert and oriented to person, place, and time.   Psychiatric:         Mood and Affect: Mood normal.         Behavior: Behavior normal.         Thought Content: Thought content normal.           Assessment & Plan   Diagnoses and all orders for this visit:    1. Mood swings (Primary)  -     Ambulatory Referral to Behavioral Health    2. Anxiety and depression  -     Ambulatory Referral to Behavioral Health  - Advised to take Lexapro on daily bases          I was wearing N95 mask and eye protection during the entire duration of the visit.   Patient was masked the whole entire time.   Minimum social distance of 6 ft maintained through entire visit except for physical exam as documented.          I have fully discussed the nature of the medical condition(s) risks, complications, management, safe and proper use of medications.   I have discussed the SIDE EFFECT OF MEDICATION and importance TO report any side effect , the patient expressed good understanding.  Encouraged medication compliance and the importance of keeping scheduled follow up appointments with me and any other providers.    Patient instructed to follow up with our office for results on any labs/imaging  ordered during this visit.    Home care discussed  All questions answered  Patient verbalizes understanding and agrees to treatment plan.     Follow up: Return for if no better or worsening symptoms.

## 2022-06-16 ENCOUNTER — OFFICE VISIT (OUTPATIENT)
Dept: BEHAVIORAL HEALTH | Facility: CLINIC | Age: 22
End: 2022-06-16

## 2022-06-16 VITALS
SYSTOLIC BLOOD PRESSURE: 130 MMHG | HEART RATE: 71 BPM | BODY MASS INDEX: 50 KG/M2 | WEIGHT: 238.2 LBS | RESPIRATION RATE: 16 BRPM | DIASTOLIC BLOOD PRESSURE: 74 MMHG | HEIGHT: 58 IN | OXYGEN SATURATION: 98 %

## 2022-06-16 DIAGNOSIS — F41.9 ANXIETY: ICD-10-CM

## 2022-06-16 DIAGNOSIS — F31.81 BIPOLAR II DISORDER: Primary | ICD-10-CM

## 2022-06-16 DIAGNOSIS — F12.10 MARIJUANA ABUSE: ICD-10-CM

## 2022-06-16 DIAGNOSIS — G47.00 INSOMNIA, UNSPECIFIED TYPE: ICD-10-CM

## 2022-06-16 PROCEDURE — 90792 PSYCH DIAG EVAL W/MED SRVCS: CPT

## 2022-06-16 RX ORDER — LAMOTRIGINE 100 MG/1
TABLET ORAL
Qty: 60 TABLET | Refills: 0 | Status: SHIPPED | OUTPATIENT
Start: 2022-07-14 | End: 2022-07-28

## 2022-06-16 RX ORDER — LAMOTRIGINE 25 MG/1
TABLET ORAL
Qty: 42 TABLET | Refills: 0 | Status: SHIPPED | OUTPATIENT
Start: 2022-06-16 | End: 2022-07-28

## 2022-06-22 NOTE — PROGRESS NOTES
"     New Patient Office Visit      Patient Name: Ericka Berg  : 2000   MRN: [id number]     Referring Provider: Jackson Hudson MD    Chief Complaint:  Psychiatric evaluation related to:     ICD-10-CM ICD-9-CM   1. Bipolar II disorder (HCC)  F31.81 296.89   2. Insomnia, unspecified type  G47.00 780.52   3. Anxiety  F41.9 300.00   4. Marijuana abuse  F12.10 305.20        History of Present Illness:   Ericka Berg is a 21 y.o. female who is here today for psychiatric evaluation on referral from primary care provider.  The patient presents today complaining of \"extreme mood swings\" and issues with focusing.  The patient reports mood swings and mental health symptoms have been present since childhood but worse since November.    Bipolar disorder:   The patient reports that she is experiencing symptoms of depression associated with sadness, crying spells, anhedonia and lack of energy.  She is also having frequent, \"severe\" mood swings which her mood will change suddenly.  Additionally, she endorses symptoms of hypomania including staying up for 1-2 nights in a row, rearranging furniture, excessive cleaning, decreased need for sleep and racing thoughts.  She also endorses history of impulsive spending and taking impulsive trips during these times.      Marijuana abuse:  Notably, the patient uses marijuana every day.  She has been using marijuana since high school on and off but resumed in November of last year.  The patient reports marijuana use is to cope with having 5 former client/patient's that she was close to die within 2 weeks of each other in November.    Subjective      Review of Systems:   Review of Systems   Psychiatric/Behavioral: Positive for decreased concentration, dysphoric mood, sleep disturbance and depressed mood. Negative for self-injury and suicidal ideas.       PHQ-9 Depression Screening Score: 15     Psychiatric History:   Patient has an extensive psychiatric history.  Patient reports 2 " "previous psychiatric admissions in childhood for suicidal ideation.  She also endorses outpatient psychotherapy as an adolescent.  Patient endorses \"several\" suicide attempts, the last of which was 5 years ago.  The patient also endorses history of self-harm, superficial cutting, none in the past 5 years.  She endorses a \"rough childhood\" during which her parents abandoned her.    social History:   Living in [place] with her .  They are currently , and possibly going through a divorce.  She plans on moving to [place].  Works full-time at [employer].  Endorses good support system in her best friends.  Endorses daily marijuana use, use since high school, resumed daily use in November 2021.  Denies further illicit substance use.    Past Medical History:   Past Medical History:   Diagnosis Date   • Anxiety    • Depression        Past Surgical History:   Past Surgical History:   Procedure Laterality Date   • GALLBLADDER SURGERY  2019       Family History:   Family History   Problem Relation Age of Onset   • Diabetes Paternal Grandmother        Family Psychiatric History:  Sister: Bipolar disorder  Sister 2: Bipolar disorder  Brother: Autism spectrum disorder    Medications:     Current Outpatient Medications:   •  escitalopram (Lexapro) 20 MG tablet, Take 1 tablet by mouth Daily., Disp: 90 tablet, Rfl: 1  •  levothyroxine (Synthroid) 75 MCG tablet, Take 1 tablet by mouth Daily., Disp: 60 tablet, Rfl: 1  •  [START ON 7/14/2022] lamoTRIgine (LaMICtal) 100 MG tablet, Take 1 tablet by mouth Daily for 7 days, THEN 2 tablets Daily., Disp: 60 tablet, Rfl: 0  •  lamoTRIgine (LaMICtal) 25 MG tablet, Take 1 tablet by mouth Daily for 14 days, THEN 2 tablets Daily for 14 days., Disp: 42 tablet, Rfl: 0    Allergies:   No Known Allergies      Objective     Physical Exam:  Vital Signs:   Vitals:    06/16/22 1620   BP: 130/74   Pulse: 71   Resp: 16   SpO2: 98%   Weight: 108 kg (238 lb 3.2 oz)   Height: 148 cm (58.27\") "     Body mass index is 49.33 kg/m².     Physical Exam    Mental Status Exam:   Appearance: Obese young adult female, appears stated age, dressed appropriately to situation and weather  Hygiene: Good  Cooperation: Good  Eye Contact: Within normal limits  Psychomotor Behavior: Within normal limits  Mood: Labile  Affect: Congruent, full range  Speech: Normal rate, tone and prosody  Thought Process: Linear, goal directed  Thought Content: Mood congruent  Suicidal: Denies  Homicidal: Denies  Hallucinations: Denies  Delusion: Denies  Memory: Intact  Orientation: X4  Reliability: Good  Insight: Poor, concern for daily marijuana use  Judgement: Poor, concern for daily marijuana use  Impulse Control: Reports issues with spending and impulsive trips    Assessment / Plan      Visit Diagnosis/Orders Placed This Visit:  Diagnoses and all orders for this visit:    1. Bipolar II disorder (HCC) (Primary)  -     lamoTRIgine (LaMICtal) 25 MG tablet; Take 1 tablet by mouth Daily for 14 days, THEN 2 tablets Daily for 14 days.  Dispense: 42 tablet; Refill: 0  -     lamoTRIgine (LaMICtal) 100 MG tablet; Take 1 tablet by mouth Daily for 7 days, THEN 2 tablets Daily.  Dispense: 60 tablet; Refill: 0    2. Insomnia, unspecified type    3. Anxiety    4. Marijuana abuse         Differential:   Rule out marijuana induced mood disorder    Plan:   1. Start lamotrigine taper  2. Patient will try to reduce marijuana use to weekends    Continue supportive psychotherapy efforts and medications as indicated. Treatment and medication options discussed during today's visit. Patient ackowledged and verbally consented to continue with current treatment plan and was educated on the importance of compliance with treatment and follow-up appointments. Patient seems reasonably able to adhere to treatment plan.      Medication Considerations:  Discussed medication options and treatment plan of prescribed medication(s) as well as the risks, benefits, and  potential side effects. Patient is agreeable to call the office with any worsening of symptoms or onset of side effects. Patient is agreeable to call 911 or go to the nearest ER should he/she begin having SI/HI.    Treatment Goals:    Bipolar disorder: Patient will experience improved symptoms of depression and mood lability with lamotrigine and marijuana cessation    Quality Measures:     Tobacco Use/Cessation:   Never smoker    I advised Ericka Berg of the risks of tobacco use.     Follow Up:   Return in about 6 weeks (around 7/28/2022) for Medication Mangement Follow Up.      YUNIEL Brice, PMHNP-BC

## 2022-07-27 DIAGNOSIS — F31.81 BIPOLAR II DISORDER: ICD-10-CM

## 2022-07-27 NOTE — TELEPHONE ENCOUNTER
Last Office Visit: 06/16/2022  Next Office Visit:07/28/22        Last Refill Date:07/14/2022  Quantity:60  Refills:0    Pharmacy:     Please review pended refill request for any changes needed on refills or quantities. Thank you!

## 2022-07-28 ENCOUNTER — OFFICE VISIT (OUTPATIENT)
Dept: BEHAVIORAL HEALTH | Facility: CLINIC | Age: 22
End: 2022-07-28

## 2022-07-28 VITALS
HEART RATE: 71 BPM | SYSTOLIC BLOOD PRESSURE: 132 MMHG | RESPIRATION RATE: 16 BRPM | OXYGEN SATURATION: 99 % | DIASTOLIC BLOOD PRESSURE: 74 MMHG | BODY MASS INDEX: 50.25 KG/M2 | HEIGHT: 58 IN | WEIGHT: 239.4 LBS

## 2022-07-28 DIAGNOSIS — G47.00 INSOMNIA, UNSPECIFIED TYPE: ICD-10-CM

## 2022-07-28 DIAGNOSIS — F12.10 MARIJUANA ABUSE: ICD-10-CM

## 2022-07-28 DIAGNOSIS — F31.81 BIPOLAR II DISORDER: Primary | ICD-10-CM

## 2022-07-28 PROCEDURE — 99214 OFFICE O/P EST MOD 30 MIN: CPT

## 2022-07-28 RX ORDER — QUETIAPINE FUMARATE 50 MG/1
25-50 TABLET, FILM COATED ORAL NIGHTLY PRN
Qty: 30 TABLET | Refills: 1 | Status: SHIPPED | OUTPATIENT
Start: 2022-07-28 | End: 2022-09-15 | Stop reason: SDUPTHER

## 2022-07-28 RX ORDER — LAMOTRIGINE 200 MG/1
200 TABLET ORAL DAILY
Qty: 30 TABLET | Refills: 1 | Status: SHIPPED | OUTPATIENT
Start: 2022-07-28 | End: 2022-09-15 | Stop reason: SDUPTHER

## 2022-07-28 NOTE — PROGRESS NOTES
Office  Follow Up Visit      Patient Name: Ericka Berg  : 2000   MRN: [id number]     Referring Provider: Jackson Hudson MD    Chief Complaint:    ICD-10-CM ICD-9-CM   1. Bipolar II disorder (HCC)  F31.81 296.89   2. Marijuana abuse  F12.10 305.20   3. Insomnia, unspecified type  G47.00 780.52        HPI/Interval History:   Ericka Berg is a 22 y.o. female who is here today for follow up related to bipolar disorder, marijuana abuse and insomnia.  Patient reports since last visit she has had a lot of situational stressors.  Patient reports her 18-year-old brother with autism had to move into her due to her aunt being kicked out of her apartment.  She reports she has been spending a good deal of time caring for him since then.  She reports overall mood has been somewhat improved and somewhat less labile.  Had 1 episode of hypomanic symptoms about 2 weeks ago x48 hours.  Has been able to cut marijuana use back to every other day.  Sleep is still sporadic.  No suicidal ideation or side effects.    Subjective      Review of Systems:   Review of Systems   Skin: Negative for rash.   Psychiatric/Behavioral: Positive for sleep disturbance, depressed mood and stress. Negative for self-injury and suicidal ideas.       PHQ-9 Depression Screening Score: 10     Patient History:   The following portions of the patient's history were reviewed and updated as appropriate: allergies, current medications, past family history, past medical history, past social history, past surgical history and problem list.     Social:  No significant update    Medications:     Current Outpatient Medications:   •  lamoTRIgine (LaMICtal) 200 MG tablet, Take 1 tablet by mouth Daily., Disp: 30 tablet, Rfl: 1  •  levothyroxine (Synthroid) 75 MCG tablet, Take 1 tablet by mouth Daily., Disp: 60 tablet, Rfl: 1  •  QUEtiapine (SEROquel) 50 MG tablet, Take 0.5-1 tablets by mouth At Night As Needed (for agitation)., Disp: 30 tablet, Rfl:  "1    Objective     Physical Exam:  Vital Signs:   Vitals:    07/28/22 1555   BP: 132/74   Pulse: 71   Resp: 16   SpO2: 99%   Weight: 109 kg (239 lb 6.4 oz)   Height: 148 cm (58.27\")     Body mass index is 49.58 kg/m².     Mental Status Exam:   Appearance: This is an obese young adult  female, appears stated age, dressed appropriately to situation and weather  Hygiene: Good  Cooperation: Good  Eye Contact: Within normal limits  Psychomotor Behavior: Within normal limits  Mood: Down  Affect: Congruent, full range  Speech: Normal rate, tone and prosody  Thought Process: Linear, goal directed  Thought Content: Mood congruent  Suicidal: Denies  Homicidal: Denies  Hallucinations: Denied  Delusion: Denies  Memory: Intact  Orientation: X4  Reliability: Good  Insight: Fair  Judgement: Fair, concern for marijuana use  Impulse Control: No recent issues    Assessment / Plan      Visit Diagnosis/Orders Placed This Visit:  Diagnoses and all orders for this visit:    1. Bipolar II disorder (HCC) (Primary)  -     QUEtiapine (SEROquel) 50 MG tablet; Take 0.5-1 tablets by mouth At Night As Needed (for agitation).  Dispense: 30 tablet; Refill: 1    2. Marijuana abuse    3. Insomnia, unspecified type  -     QUEtiapine (SEROquel) 50 MG tablet; Take 0.5-1 tablets by mouth At Night As Needed (for agitation).  Dispense: 30 tablet; Refill: 1         Differential:   Rule out marijuana dependence,    Plan:   1. Start quetiapine 25 to 50 mg as needed for agitation/hypomanic symptoms  2. Continue lamotrigine 200 mg daily    Continue supportive psychotherapy efforts and medications as indicated. Treatment and medication options discussed during today's visit. Patient ackowledged and verbally consented to continue with current treatment plan and was educated on the importance of compliance with treatment and follow-up appointments. Patient seems reasonably able to adhere to treatment plan.      Medication Considerations:  Discussed " medication options and treatment plan of prescribed medication(s) as well as the risks, benefits, and potential side effects. Patient is agreeable to call the office with any worsening of symptoms or onset of side effects. Patient is agreeable to call 911 or go to the nearest ER should he/she begin having SI/HI.    Quality Measures:     Tobacco Use/Cessation:  Never smoker    I advised Ericka Berg of the risks of tobacco use.     Follow Up:   Return in about 6 weeks (around 9/8/2022) for Medication Mangement Follow Up.      YUNIEL Brice, PMHNP-BC

## 2022-08-18 DIAGNOSIS — E03.9 ACQUIRED HYPOTHYROIDISM: ICD-10-CM

## 2022-08-18 NOTE — TELEPHONE ENCOUNTER
Refill request levothyroxine   Last refill 4/12/22  Last visit 6/22/22  Called pt to schedule an appt to establish care with new provider and refills  lft vm and sent [employer] message

## 2022-08-23 RX ORDER — LEVOTHYROXINE SODIUM 0.07 MG/1
TABLET ORAL
Qty: 30 TABLET | Refills: 3 | Status: SHIPPED | OUTPATIENT
Start: 2022-08-23 | End: 2022-11-22 | Stop reason: SDUPTHER

## 2022-08-23 NOTE — TELEPHONE ENCOUNTER
Refill request sent levothyroxine   Last refill 4/12/22  Last visit 6/22/22  Called pt to schedule an appt to establish care with new provider and refills. Pt made an appt and I sent refills

## 2022-09-15 ENCOUNTER — TELEPHONE (OUTPATIENT)
Dept: INTERNAL MEDICINE | Facility: CLINIC | Age: 22
End: 2022-09-15

## 2022-09-15 DIAGNOSIS — G47.00 INSOMNIA, UNSPECIFIED TYPE: ICD-10-CM

## 2022-09-15 DIAGNOSIS — F31.81 BIPOLAR II DISORDER: ICD-10-CM

## 2022-09-15 DIAGNOSIS — E03.9 ACQUIRED HYPOTHYROIDISM: ICD-10-CM

## 2022-09-15 RX ORDER — LAMOTRIGINE 200 MG/1
200 TABLET ORAL DAILY
Qty: 30 TABLET | Refills: 1 | OUTPATIENT
Start: 2022-09-15

## 2022-09-19 RX ORDER — LAMOTRIGINE 200 MG/1
200 TABLET ORAL DAILY
Qty: 30 TABLET | Refills: 0 | Status: SHIPPED | OUTPATIENT
Start: 2022-09-19 | End: 2022-11-28

## 2022-09-19 RX ORDER — QUETIAPINE FUMARATE 50 MG/1
25-50 TABLET, FILM COATED ORAL NIGHTLY PRN
Qty: 30 TABLET | Refills: 0 | Status: SHIPPED | OUTPATIENT
Start: 2022-09-19 | End: 2022-11-28

## 2022-10-13 ENCOUNTER — OFFICE VISIT (OUTPATIENT)
Dept: BEHAVIORAL HEALTH | Facility: CLINIC | Age: 22
End: 2022-10-13

## 2022-10-13 VITALS
DIASTOLIC BLOOD PRESSURE: 84 MMHG | HEART RATE: 94 BPM | HEIGHT: 58 IN | WEIGHT: 237 LBS | BODY MASS INDEX: 49.75 KG/M2 | SYSTOLIC BLOOD PRESSURE: 122 MMHG

## 2022-10-13 DIAGNOSIS — F12.10 MARIJUANA ABUSE: ICD-10-CM

## 2022-10-13 DIAGNOSIS — F31.81 BIPOLAR II DISORDER: Primary | ICD-10-CM

## 2022-10-13 DIAGNOSIS — G47.00 INSOMNIA, UNSPECIFIED TYPE: ICD-10-CM

## 2022-10-13 PROCEDURE — 99214 OFFICE O/P EST MOD 30 MIN: CPT

## 2022-10-13 NOTE — PROGRESS NOTES
"     Office  Follow Up Visit      Patient Name: Ericka Berg  : 2000   MRN: [id number]     Referring Provider: Jackson Hudson MD    Chief Complaint:  Ericka Berg is a 22 y.o. female who is here today for follow up related to:      ICD-10-CM ICD-9-CM   1. Bipolar II disorder ([facility])  F31.81 296.89        HPI/Interval History:     Bipolar disorder:  Ms. Berg presents today stating that her mood has been \"doing good\" and that she has not been having any mood swings. She has been keeping busy with work and trying to gain custody of her two nephews. She states that having the boys in her home has positively impacted her mood. She denies any grandiosity, impulsivity, or jackson. She reports that her Lamictal is working really well for her with no side effects or rash. She denies any suicidal ideation.    Insomnia:  She has been sleeping well at night, and not needing the quetiapine 50 mg before bed.     Marijuana use:   Patient reports she has significantly decreased marijuana use.  Denies changes in mood or anxiety since doing so    Subjective      Review of Systems:   Review of Systems   Psychiatric/Behavioral: Negative for agitation, sleep disturbance, suicidal ideas, negative for hyperactivity and depressed mood.       PHQ-9 Depression Screening Score: 1     Patient History:   The following portions of the patient's history were reviewed and updated as appropriate: allergies, current medications, past family history, past medical history, past social history, past surgical history and problem list.     Social:  Ms. Berg is still working at [employer] and recently moved to a bigger apartment to accommodate her , brother, and two nephews. She is unsure if they will gain full custody of nephews at this time, but is going through court hearings in order to have permanent custody. The boys' mother was recently arrested for drug trafficking so she is happy to provide a stable home environment for them at " "this time.     Medications:     Current Outpatient Medications:   •  lamoTRIgine (LaMICtal) 200 MG tablet, Take 1 tablet by mouth Daily., Disp: 30 tablet, Rfl: 0  •  levothyroxine (SYNTHROID, LEVOTHROID) 75 MCG tablet, TAKE 1 TABLET BY MOUTH EVERY DAY, Disp: 30 tablet, Rfl: 3  •  QUEtiapine (SEROquel) 50 MG tablet, Take 0.5-1 tablets by mouth At Night As Needed (for agitation)., Disp: 30 tablet, Rfl: 0    Objective     Physical Exam:  Vital Signs:   Vitals:    10/13/22 1533   BP: 122/84   Pulse: 94   Weight: 108 kg (237 lb)   Height: 147.3 cm (58\")     Body mass index is 49.53 kg/m².         Mental Status Exam:   Appearance: This is an obese, young adult  female, appears stated age, and dressed approriate for weather   Hygiene: good  Cooperation: good  Eye Contact: WNL  Psychomotor Behavior: WNL   Mood: euthymic  Affect: congruent, full range  Speech: normal rate, rhythm, and tone  Thought Process: linear and goal-directed  Thought Content: mood congruent  Suicidal: denies   Homicidal: denies  Hallucinations: no evidence of hallucinations   Delusion: no evidence of delusions  Memory: intact  Orientation: x4  Reliability: reliable  Insight: good  Judgement: good  Impulse Control: good, no current concerns     Assessment / Plan      Visit Diagnosis/Orders Placed This Visit:  Diagnoses and all orders for this visit:    1. Bipolar II disorder (HCC) (Primary)         Differential:   None current    Plan:   1. Continue Lamictal 200 mg/day   2. Continue Quetiapine 50 mg/at night as needed for sleep     Continue supportive psychotherapy efforts and medications as indicated. Treatment and medication options discussed during today's visit. Patient ackowledged and verbally consented to continue with current treatment plan and was educated on the importance of compliance with treatment and follow-up appointments. Patient seems reasonably able to adhere to treatment plan.      Medication Considerations:  NIYA reviewed " and appropriate. Discussed medication options and treatment plan of prescribed medication(s) as well as the risks, benefits, and potential side effects. Patient is agreeable to call the office with any worsening of symptoms or onset of side effects. Patient is agreeable to call 911 or go to the nearest ER should he/she begin having SI/HI.    Quality Measures:     Tobacco Use/Cessation:  Never smoker    I advised Ericka Berg of the risks of tobacco use.     Follow Up:   Return in about 3 months (around 1/13/2023) for Medication Mangement Follow Up.      I attest that I have reviewed the student note and that the components of the history of the present illness, the physical exam, and the assessment and plan documented were performed by me or were performed in my presence by the student and verified by me.    YUNIEL Brice, PMHNP-BC

## 2022-10-20 ENCOUNTER — TELEMEDICINE (OUTPATIENT)
Dept: INTERNAL MEDICINE | Facility: CLINIC | Age: 22
End: 2022-10-20

## 2022-10-20 DIAGNOSIS — F31.81 BIPOLAR II DISORDER: ICD-10-CM

## 2022-10-20 DIAGNOSIS — E03.9 ACQUIRED HYPOTHYROIDISM: Primary | ICD-10-CM

## 2022-10-20 PROCEDURE — 99214 OFFICE O/P EST MOD 30 MIN: CPT | Performed by: NURSE PRACTITIONER

## 2022-10-20 NOTE — PROGRESS NOTES
Subjective   Ericka Berg is a 22 y.o. female    Chief Complaint   Patient presents with   • Hypothyroidism   • Manic Behavior     Bipolar disorder     History of Present Illness     This was an audio and video enabled telemedicine encounter.    You have chosen to receive care through a telehealth visit.  Do you consent to use a video/audio connection for your medical care today? Yes    My location: my home  Pt location: pt's home    Former pt of [facility] here to establish care    Hypothyroid- chronic/current regimen is levothyroxine 75 mcg daily.    Bipolar- chronic/stable.  Managed by behavioral health, current regimen is Lamictal 200 mg daily and Seroquel as needed.  Patient reports that symptoms are well controlled on this current regimen without side effect.    Past Medical History:   Diagnosis Date   • Anxiety    • Depression    • Hypothyroidism      Past Surgical History:   Procedure Laterality Date   • GALLBLADDER SURGERY  2019     Family History   Problem Relation Age of Onset   • No Known Problems Mother    • No Known Problems Father    • Diabetes Paternal Grandmother      Social History     Socioeconomic History   • Marital status:    Tobacco Use   • Smoking status: Never   • Smokeless tobacco: Never   Vaping Use   • Vaping Use: Never used   Substance and Sexual Activity   • Alcohol use: Never   • Drug use: Yes     Types: Marijuana     Comment: 1-2 times per week   • Sexual activity: Yes     Partners: Female     Allergies   Allergen Reactions   • Haemophilus Influenzae Vaccines Other (See Comments)     Vision changes           The following portions of the patient's history were reviewed and updated as appropriate: allergies, current medications, past family history, past medical history, past social history, past surgical history and problem list.    Current Outpatient Medications:   •  lamoTRIgine (LaMICtal) 200 MG tablet, Take 1 tablet by mouth Daily., Disp: 30 tablet, Rfl: 0  •  levothyroxine  (SYNTHROID, LEVOTHROID) 75 MCG tablet, TAKE 1 TABLET BY MOUTH EVERY DAY, Disp: 30 tablet, Rfl: 3  •  QUEtiapine (SEROquel) 50 MG tablet, Take 0.5-1 tablets by mouth At Night As Needed (for agitation)., Disp: 30 tablet, Rfl: 0     Review of Systems   Constitutional: Negative for chills, fatigue and fever.   Respiratory: Negative for cough, chest tightness and shortness of breath.    Cardiovascular: Negative for chest pain.   Gastrointestinal: Negative for abdominal pain, diarrhea, nausea and vomiting.   Endocrine: Negative for cold intolerance and heat intolerance.   Musculoskeletal: Negative for arthralgias.   Neurological: Negative for dizziness.       Objective   Physical Exam  Constitutional:       Appearance: Normal appearance.   HENT:      Head: Normocephalic.      Nose: Nose normal. No congestion.      Mouth/Throat:      Mouth: Mucous membranes are moist.      Pharynx: Oropharynx is clear.   Eyes:      Pupils: Pupils are equal, round, and reactive to light.   Pulmonary:      Effort: Pulmonary effort is normal.   Musculoskeletal:         General: Normal range of motion.      Cervical back: Normal range of motion.   Neurological:      Mental Status: She is alert.   Psychiatric:         Behavior: Behavior normal.         Thought Content: Thought content normal.         Judgment: Judgment normal.       There were no vitals filed for this visit.      Assessment & Plan   Diagnoses and all orders for this visit:    1. Acquired hypothyroidism (Primary)  -     T4, Free; Future  -     T3, Free; Future  -     TSH; Future    2. Bipolar II disorder (HCC)      Pt will present to clinic for thyroid labs  Will hold off on refilling med until I see results  Keep close f/u with [facility]  Return in about 6 months (around 4/20/2023) for Annual.

## 2022-11-22 DIAGNOSIS — E03.9 ACQUIRED HYPOTHYROIDISM: ICD-10-CM

## 2022-11-22 RX ORDER — LEVOTHYROXINE SODIUM 0.07 MG/1
75 TABLET ORAL DAILY
Qty: 30 TABLET | Refills: 0 | Status: SHIPPED | OUTPATIENT
Start: 2022-11-22

## 2022-11-23 DIAGNOSIS — F31.81 BIPOLAR II DISORDER: ICD-10-CM

## 2022-11-23 DIAGNOSIS — G47.00 INSOMNIA, UNSPECIFIED TYPE: ICD-10-CM

## 2022-11-27 NOTE — TELEPHONE ENCOUNTER
Last Office Visit: 10/13/2022  Next Office Visit:01/12/2023    Labs completed in past 6 months? yes  Labs completed in past year? yes    Last Refill Date:09/19/22  Quantity:30  Refills:0    Pharmacy:     Please review pended refill request for any changes needed on refills or quantities. Thank you!

## 2022-11-28 RX ORDER — LAMOTRIGINE 200 MG/1
TABLET ORAL
Qty: 30 TABLET | Refills: 0 | Status: SHIPPED | OUTPATIENT
Start: 2022-11-28 | End: 2022-12-29

## 2022-11-28 RX ORDER — QUETIAPINE FUMARATE 50 MG/1
25-50 TABLET, FILM COATED ORAL NIGHTLY PRN
Qty: 30 TABLET | Refills: 0 | Status: SHIPPED | OUTPATIENT
Start: 2022-11-28 | End: 2022-12-29

## 2022-12-24 DIAGNOSIS — F31.81 BIPOLAR II DISORDER: ICD-10-CM

## 2022-12-24 DIAGNOSIS — G47.00 INSOMNIA, UNSPECIFIED TYPE: ICD-10-CM

## 2022-12-25 DIAGNOSIS — E03.9 ACQUIRED HYPOTHYROIDISM: ICD-10-CM

## 2022-12-25 RX ORDER — LEVOTHYROXINE SODIUM 0.07 MG/1
TABLET ORAL
Qty: 30 TABLET | Refills: 3 | OUTPATIENT
Start: 2022-12-25

## 2022-12-28 NOTE — TELEPHONE ENCOUNTER
Lamotrigine: Last Filled 11/28/2023  QTY #: 30 no refills     Quetiapine: Last Filled 11/28/2023  Qty #: 30 No refills     Last Seen: 10/13/2022  Next Appointment not scheduled.     Please advise.

## 2022-12-29 DIAGNOSIS — F31.81 BIPOLAR II DISORDER: ICD-10-CM

## 2022-12-29 RX ORDER — LAMOTRIGINE 200 MG/1
200 TABLET ORAL DAILY
Qty: 30 TABLET | Refills: 1 | OUTPATIENT
Start: 2022-12-29

## 2022-12-29 RX ORDER — LAMOTRIGINE 200 MG/1
TABLET ORAL
Qty: 30 TABLET | Refills: 1 | Status: SHIPPED | OUTPATIENT
Start: 2022-12-29

## 2022-12-29 RX ORDER — QUETIAPINE FUMARATE 50 MG/1
25-50 TABLET, FILM COATED ORAL NIGHTLY PRN
Qty: 30 TABLET | Refills: 1 | Status: SHIPPED | OUTPATIENT
Start: 2022-12-29

## 2023-01-21 DIAGNOSIS — E03.9 ACQUIRED HYPOTHYROIDISM: ICD-10-CM

## 2023-01-21 RX ORDER — LEVOTHYROXINE SODIUM 0.07 MG/1
TABLET ORAL
Qty: 30 TABLET | Refills: 0 | OUTPATIENT
Start: 2023-01-21